# Patient Record
Sex: MALE | Race: BLACK OR AFRICAN AMERICAN
[De-identification: names, ages, dates, MRNs, and addresses within clinical notes are randomized per-mention and may not be internally consistent; named-entity substitution may affect disease eponyms.]

---

## 2018-04-14 ENCOUNTER — HOSPITAL ENCOUNTER (EMERGENCY)
Dept: HOSPITAL 97 - ER | Age: 48
Discharge: HOME | End: 2018-04-14
Payer: COMMERCIAL
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
## 2018-04-14 DIAGNOSIS — R55: Primary | ICD-10-CM
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
## 2018-04-14 LAB
ALBUMIN SERPL BCP-MCNC: 4.4 G/DL (ref 3.2–5.5)
ALP SERPL-CCNC: 54 IU/L (ref 42–121)
ALT SERPL W P-5'-P-CCNC: 15 IU/L (ref 10–60)
AST SERPL W P-5'-P-CCNC: 32 IU/L (ref 10–42)
BLD SMEAR INTERP: (no result)
BUN BLD-MCNC: 14 MG/DL (ref 6–20)
GIANT PLATELETS BLD QL SMEAR: 1
GLUCOSE SERPLBLD-MCNC: 206 MG/DL (ref 65–120)
HCT VFR BLD CALC: 46.5 % (ref 39.6–49)
INR BLD: 0.94
LYMPHOCYTES # SPEC AUTO: 0.5 K/UL (ref 0.7–4.9)
MAGNESIUM SERPL-MCNC: 1.6 MG/DL (ref 1.8–2.5)
MCH RBC QN AUTO: 33.3 PG (ref 27–35)
MCV RBC: 98.9 FL (ref 80–100)
MORPHOLOGY BLD-IMP: (no result)
PMV BLD: 10.3 FL (ref 7.6–11.3)
POTASSIUM SERPL-SCNC: 4 MEQ/L (ref 3.6–5)
RBC # BLD: 4.7 M/UL (ref 4.33–5.43)

## 2018-04-14 PROCEDURE — 99284 EMERGENCY DEPT VISIT MOD MDM: CPT

## 2018-04-14 PROCEDURE — 83880 ASSAY OF NATRIURETIC PEPTIDE: CPT

## 2018-04-14 PROCEDURE — 80048 BASIC METABOLIC PNL TOTAL CA: CPT

## 2018-04-14 PROCEDURE — 82962 GLUCOSE BLOOD TEST: CPT

## 2018-04-14 PROCEDURE — 85610 PROTHROMBIN TIME: CPT

## 2018-04-14 PROCEDURE — 71045 X-RAY EXAM CHEST 1 VIEW: CPT

## 2018-04-14 PROCEDURE — 36415 COLL VENOUS BLD VENIPUNCTURE: CPT

## 2018-04-14 PROCEDURE — 85025 COMPLETE CBC W/AUTO DIFF WBC: CPT

## 2018-04-14 PROCEDURE — 80076 HEPATIC FUNCTION PANEL: CPT

## 2018-04-14 PROCEDURE — 82550 ASSAY OF CK (CPK): CPT

## 2018-04-14 PROCEDURE — 96366 THER/PROPH/DIAG IV INF ADDON: CPT

## 2018-04-14 PROCEDURE — 83735 ASSAY OF MAGNESIUM: CPT

## 2018-04-14 PROCEDURE — 84484 ASSAY OF TROPONIN QUANT: CPT

## 2018-04-14 PROCEDURE — 96365 THER/PROPH/DIAG IV INF INIT: CPT

## 2018-04-14 PROCEDURE — 70450 CT HEAD/BRAIN W/O DYE: CPT

## 2018-04-14 PROCEDURE — 93005 ELECTROCARDIOGRAM TRACING: CPT

## 2018-04-14 NOTE — RAD REPORT
EXAM DESCRIPTION:  RAD - Chest Single View - 4/14/2018 4:44 pm

 

CLINICAL HISTORY:  Syncope, fall, chest injury

 

COMPARISON:  None.

 

TECHNIQUE:  AP portable chest image was obtained 1633 hours .

 

FINDINGS:  Lungs are clear. No cardiomegaly or other suspicious heart finding. Vasculature is normal.
 Trachea is midline. No measurable pleural effusion and no pneumothorax. No gross bony abnormality se
en. No acute aortic findings suspected.

 

IMPRESSION:  No acute cardiopulmonary process.

## 2018-04-14 NOTE — RAD REPORT
EXAM DESCRIPTION:  CT - Head Brain Wo Cont - 4/14/2018 4:46 pm

 

CLINICAL HISTORY:  Syncope

 

COMPARISON:  None.

 

TECHNIQUE:  Axial 5 mm thick images of the head were obtained without IV contrast.

 

All CT scans are performed using dose optimization technique as appropriate and may include automated
 exposure control or mA/KV adjustment according to patient size.

 

FINDINGS:  No intracranial hemorrhage, mass, edema or shift of mid-line structures. No acute infarcti
on changes seen. No abnormal extra-axial fluid collections. Ventricles are normal.

 

Mastoid air cells and visualized portions of the paranasal sinuses are clear.

 

No acute bony findings.

 

 

IMPRESSION:  Negative non-contrast CT head examination.

## 2018-04-14 NOTE — ER
Nurse's Notes                                                                                     

 [facility]                                                                

Name: Devyn Sheehan                                                                                

Age: 47 yrs                                                                                       

Sex: Male                                                                                         

: 1970                                                                                   

MRN: [id number]                                                                                   

Arrival Date: 2018                                                                          

Time: 16:05                                                                                       

Account#: [id number]                                                                            

Bed 4                                                                                             

Private MD:                                                                                       

Diagnosis: Syncope and collapse                                                                   

                                                                                                  

Presentation:                                                                                     

                                                                                             

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  

      like he was going to pass out and then fell to floor, hitting head, was witnessed, pt       

      came around quickly, VSS, pt states he has not been drinking much water over past 24        

      hours. Transition of care: patient was not received from another setting of care. Onset     

      of symptoms was 2018. Care prior to arrival: None.                                

16:05 Method Of Arrival: Ambulatory                                                           iw  

16:05 Acuity: CHETAN 3                                                                           iw  

                                                                                                  

Historical:                                                                                       

- Allergies:                                                                                      

16:09 NKA;                                                                                    iw  

- Home Meds:                                                                                      

16:09 None [Active];                                                                          iw  

- PMHx:                                                                                           

16:09 None;                                                                                   iw  

- PSHx:                                                                                           

16:09 None;                                                                                   iw  

                                                                                                  

- Immunization history:: Adult Immunizations up to date.                                          

- Social history:: Smoking status: Patient/guardian denies using tobacco, never smoked.           

                                                                                                  

                                                                                                  

Screenin:33 Abuse screen: Denies threats or abuse. Nutritional screening: No deficits noted.        ae1 

      Tuberculosis screening: No symptoms or risk factors identified. Fall Risk Fall in past      

      12 months (25 points). No secondary diagnosis (0 pts). IV access (20 points).               

      Ambulatory Aid- None/Bed Rest/Nurse Assist (0 pts). Gait- Normal/Bed Rest/Wheelchair (0     

      pts) Mental Status- Oriented to own ability (0 pts).                                        

                                                                                                  

Assessment:                                                                                       

16:37 General: Appears in no apparent distress. comfortable, well groomed, Behavior is calm,  ae1 

      cooperative. Pain: Denies pain. Neuro: Level of Consciousness is awake, alert, obeys        

      commands, Oriented to person, place, time, situation. Cardiovascular: Heart tones S1 S2     

      present Patient's skin is warm and dry. Rhythm is regular. Respiratory: Airway is           

      patent Respiratory effort is even, unlabored, Respiratory pattern is regular,               

      symmetrical, Breath sounds are clear bilaterally. GI: No signs and/or symptoms were         

      reported involving the gastrointestinal system. : No signs and/or symptoms were           

      reported regarding the genitourinary system. Reports He feels "dehydrated", states he       

      knows how much he really drinks and has not been drinking enough fluid. EENT: No signs      

      and/or symptoms were reported regarding the EENT system. Derm: Skin is normal.              

      Musculoskeletal: No signs and/or symptoms reported regarding the musculoskeletal system.    

17:00 Reassessment: Patient appears in no apparent distress at this time. Family at bedside.  ae1 

      Patient states feeling better.                                                              

17:53 Reassessment: Patient appears in no apparent distress at this time. Patient and/or      ae1 

      family updated on plan of care and expected duration. Pain level reassessed. family at      

      bedside. Patient denies pain at this time. Patient states feeling better.                   

                                                                                                  

Vital Signs:                                                                                      

16:14  / 86; Pulse 55; Resp 14; Temp 98.8(A); Pulse Ox 100% on R/A;                     ae1 

17:05  / 87; Pulse 64; Resp 15; Pulse Ox 100% on R/A;                                   ae1 

17:09  / 91 Supine; Pulse 63; Resp 18; Pulse Ox 99% on R/A;                             ae1 

17:11  / 89 Sitting; Pulse 76; Resp 20; Pulse Ox 99% on R/A;                            ae1 

17:13  / 94; Pulse 78; Resp 20; Pulse Ox 96% on R/A;                                    ae1 

17:47  / 81; Pulse 61; Resp 15; Pulse Ox 100% on R/A;                                   ae1 

                                                                                                  

ED Course:                                                                                        

16:05 Patient arrived in ED.                                                                  iw  

16:08 [facility]                                                               [id number] 

16:08 Ok Askew MD is Attending Physician.                                              [id number] 

16:09 Triage completed.                                                                       iw  

16:13 Nikolai Davalos, RN is Primary Nurse.                                                   ae1 

16:14 Arm band placed on right wrist.                                                         ae1 

16:14 Placed in gown. Bed in low position. Call light in reach. Side rails up X 1. Cardiac    ae1 

      monitor on. Pulse ox on. NIBP on. Warm blanket given.                                       

16:32 Inserted saline lock: 22 gauge in right antecubital area, using aseptic technique.      ae1 

      ,using aseptic technique. By Adolfo  TECH Blood collected.                                

16:41 XRAY Chest (1 view) In Process Unspecified.                                             EDMS

16:46 CT Head Brain wo Cont In Process Unspecified.                                           EDMS

18:23 No provider procedures requiring assistance completed. IV discontinued, intact,         ae1 

      bleeding controlled, No redness/swelling at site. Pressure dressing applied.                

                                                                                                  

Administered Medications:                                                                         

01:15 Drug: Magnesium Sulfate 1 grams Route: IVPB; Infused Over: 1 hrs; Site: right           ae1 

      antecubital;                                                                                

18:24 Follow up: IV Status: Completed infusion                                                ae1 

16:28 Drug: NS 0.9% 1000 ml Route: IV; Rate: 1000 ml; Site: right antecubital;                ae1 

18:24 Follow up: IV Status: Completed infusion                                                ae1 

                                                                                                  

                                                                                                  

Point of Care Testing:                                                                            

      Blood Glucose:                                                                              

16:18 Blood Glucose: 182 mg/dL;                                                               ae1 

      Ranges:                                                                                     

                                                                                                  

Outcome:                                                                                          

17:46 Discharge ordered by MD.                                                                octavio 

18:23 Discharged to home via wheelchair, with family.                                         ae1 

18:23 Condition: stable                                                                           

18:23 Discharge instructions given to patient, Instructed on discharge instructions, follow       

      up and referral plans. Demonstrated understanding of instructions.                          

18:24 Patient left the ED.                                                                    ae1 

                                                                                                  

Addendum:                                                                                         

2018                                                                                        

     12:49 Addendum: Other 1 gram Magnesium Sulfate IVPB was administered at 1720 and infused over a
e1

           1 hour.                                                                                

                                                                                                  

Signatures:                                                                                       

Dispatcher [employer]                           Sherri Christian RN RN iw Roszak, Josh, PA PA jr8                                                  

Nikolai Davalos RN                     RN   ae1                                                  

                                                                                                  

**************************************************************************************************

## 2018-04-14 NOTE — EDPHYS
Physician Documentation                                                                           

 [facility]                                                                

Name: Devyn Sheehan                                                                                

Age: 47 yrs                                                                                       

Sex: Male                                                                                         

: 1970                                                                                   

MRN: [id number]                                                                                   

Arrival Date: 2018                                                                          

Time: 16:05                                                                                       

Account#: [id number]                                                                            

Bed 4                                                                                             

Private MD:                                                                                       

ED Physician Ok Askew                                                                       

HPI:                                                                                              

                                                                                             

16:57 This 47 yrs old Male presents to ER via Ambulatory with complaints of Syncope.          jr8 

16:57 The patient has experienced syncope, collapsed. Onset: The symptoms/episode             jr8 

      began/occurred acutely, today. Duration: This was a single episode. Context: the            

      episode(s) was witnessed, by a friend, occurred hospital, occurred while the patient        

      was standing, Just prior to the episode the patient experienced lightheadedness.            

      Associated injury: The patient did not suffer any apparent associated injury.               

      Associated signs and symptoms: The patient has no apparent associated signs or              

      symptoms. Current symptoms: Currently, the patient is not experiencing any symptoms,        

      the patient feels back to baseline, no decreased level of consciousness, no confusion,      

      no dysphasia, no headache, no paralysis, no visual changes. The patient has not             

      experienced similar symptoms in the past. The patient has not recently seen a               

      physician. 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         

      today. Stated that while standing started to feel lightheaded. Was going to reach for       

      door and collapsed. Stated that he fell backward per staff and hit head. Patient denied     

      chest pain, shortness of breath, numbness, tingling, or weakness prior to or after          

      episode. Feels back to baseline now .                                                       

                                                                                                  

Historical:                                                                                       

- Allergies:                                                                                      

16:09 NKA;                                                                                    iw  

- Home Meds:                                                                                      

16:09 None [Active];                                                                          iw  

- PMHx:                                                                                           

16:09 None;                                                                                   iw  

- PSHx:                                                                                           

16:09 None;                                                                                   iw  

                                                                                                  

- Immunization history:: Adult Immunizations up to date.                                          

- Social history:: Smoking status: Patient/guardian denies using tobacco, never smoked.           

                                                                                                  

                                                                                                  

ROS:                                                                                              

16:57 Eyes: Negative for injury, pain, redness, and discharge, ENT: Negative for injury,      jr8 

      pain, and discharge, Neck: Negative for injury, pain, and swelling, Cardiovascular:         

      Negative for chest pain, palpitations, and edema, Respiratory: Negative for shortness       

      of breath, cough, wheezing, and pleuritic chest pain, Abdomen/GI: Negative for              

      abdominal pain, nausea, vomiting, diarrhea, and constipation, Back: Negative for injury     

      and pain, MS/Extremity: Negative for injury and deformity, Skin: Negative for injury,       

      rash, and discoloration.                                                                    

16:57 Neuro: Positive for syncope.                                                                

                                                                                                  

Exam:                                                                                             

16:57 Head/Face:  Normocephalic, atraumatic. Eyes:  Pupils equal round and reactive to light, jr8 

      extra-ocular motions intact.  Lids and lashes normal.  Conjunctiva and sclera are           

      non-icteric and not injected.  Cornea within normal limits.  Periorbital areas with no      

      swelling, redness, or edema. ENT:  Nares patent. No nasal discharge, no septal              

      abnormalities noted.  Tympanic membranes are normal and external auditory canals are        

      clear.  Oropharynx with no redness, swelling, or masses, exudates, or evidence of           

      obstruction, uvula midline.  Mucous membranes moist. Neck:  Trachea midline, no             

      thyromegaly or masses palpated, and no cervical lymphadenopathy.  Supple, full range of     

      motion without nuchal rigidity, or vertebral point tenderness.  No Meningismus.             

      Cardiovascular:  Regular rate and rhythm with a normal S1 and S2.  No gallops, murmurs,     

      or rubs.  Normal PMI, no JVD.  No pulse deficits. Respiratory:  Lungs have equal breath     

      sounds bilaterally, clear to auscultation and percussion.  No rales, rhonchi or wheezes     

      noted.  No increased work of breathing, no retractions or nasal flaring. Abdomen/GI:        

      Soft, non-tender, with normal bowel sounds.  No distension or tympany.  No guarding or      

      rebound.  No evidence of tenderness throughout. Back:  No spinal tenderness.  No            

      costovertebral tenderness.  Full range of motion. Skin:  Warm, dry with normal turgor.      

      Normal color with no rashes, no lesions, and no evidence of cellulitis. MS/ Extremity:      

      Pulses equal, no cyanosis.  Neurovascular intact.  Full, normal range of motion. Neuro:     

       Awake and alert, GCS 15, oriented to person, place, time, and situation.  Cranial          

      nerves II-XII grossly intact.  Motor strength 5/5 in all extremities.  Sensory grossly      

      intact.  Cerebellar exam normal.  Normal gait.                                              

                                                                                                  

Vital Signs:                                                                                      

16:14  / 86; Pulse 55; Resp 14; Temp 98.8(A); Pulse Ox 100% on R/A;                     ae1 

17:05  / 87; Pulse 64; Resp 15; Pulse Ox 100% on R/A;                                   ae1 

17:09  / 91 Supine; Pulse 63; Resp 18; Pulse Ox 99% on R/A;                             ae1 

17:11  / 89 Sitting; Pulse 76; Resp 20; Pulse Ox 99% on R/A;                            ae1 

17:13  / 94; Pulse 78; Resp 20; Pulse Ox 96% on R/A;                                    ae1 

17:47  / 81; Pulse 61; Resp 15; Pulse Ox 100% on R/A;                                   ae1 

                                                                                                  

MDM:                                                                                              

16:08 Patient medically screened.                                                             jr8 

17:45 Data reviewed: vital signs, nurses notes, lab test result(s), EKG, radiologic studies,  jr8 

      CT scan, plain films, and as a result, I will discharge patient. Data interpreted:          

      Pulse oximetry: on room air is 96 %. Interpretation: normal. Counseling: I had a            

      detailed discussion with the patient and/or guardian regarding: the historical points,      

      exam findings, and any diagnostic results supporting the discharge/admit diagnosis, lab     

      results, radiology results, the need for outpatient follow up, a cardiologist, a family     

      practitioner, to return to the emergency department if symptoms worsen or persist or if     

      there are any questions or concerns that arise at home. ED course: Patient has remained     

      asymptomatic while in ED. Feels back to baseline. No significant abnormalities noted to     

      cause syncope. Mild LVH noted on EKG which I told patient can be normal variant but         

      that cardiology should see him and complete Echocardiogram to r/o. Patient pleased and      

      will follow up .                                                                            

17:46 Differential Diagnosis: cardiac arrhythmia, cerebrovascular accident, drug effect,      jr8 

      emotional response, idiopathic syncope, seizure, transient ischemic attack, vasovagal       

      episode.                                                                                    

                                                                                                  

                                                                                             

16:23 Order name: Basic Metabolic Panel; Complete Time: 17:02                                                                                                                              

16:23 Order name: BNP; Complete Time: 17:05                                                                                                                                                

16:23 Order name: CBC with Diff; Complete Time: 18:21                                                                                                                                      

16:23 Order name: CPK; Complete Time: 17:02                                                                                                                                                

16:23 Order name: LFT's; Complete Time: 17:02                                                                                                                                              

16:23 Order name: Magnesium; Complete Time: 17:02                                                                                                                                          

16:23 Order name: PT-INR; Complete Time: 16:49                                                                                                                                             

16:23 Order name: Troponin (emerg Dept Use Only); Complete Time: 17:02                        8 

                                                                                             

16:23 Order name: XRAY Chest (1 view); Complete Time: 16:57                                                                                                                                

16:23 Order name: EKG; Complete Time: 16:24                                                   8 

                                                                                             

16:23 Order name: CT Head Brain wo Cont; Complete Time: 16:57                                 jr8 

                                                                                             

18:18 Order name: CBC Smear Scan; Complete Time: 18:21                                        EDMS

                                                                                             

16:23 Order name: Cardiac monitoring; Complete Time: 16:32                                                                                                                                 

16:23 Order name: EKG - Nurse/Tech; Complete Time: 16:32                                                                                                                                   

16:23 Order name: IV Saline Lock; Complete Time: 16:32                                                                                                                                     

16:23 Order name: Labs collected and sent; Complete Time: 16:32                                                                                                                            

16:23 Order name: O2 Per Protocol; Complete Time: 16:32                                                                                                                                    

16:23 Order name: O2 Sat Monitoring; Complete Time: 16:32                                                                                                                                  

16:23 Order name: Urine Dipstick-Ancillary (obtain specimen)                                                                                                                               

16:23 Order name: Orthostatics; Complete Time: 18:24                                           

                                                                                                  

Administered Medications:                                                                         

01:15 Drug: Magnesium Sulfate 1 grams Route: IVPB; Infused Over: 1 hrs; Site: right           ae1 

      antecubital;                                                                                

18:24 Follow up: IV Status: Completed infusion                                                ae1 

16:28 Drug: NS 0.9% 1000 ml Route: IV; Rate: 1000 ml; Site: right antecubital;                ae1 

18:24 Follow up: IV Status: Completed infusion                                                ae1 

                                                                                                  

                                                                                                  

Point of Care Testing:                                                                            

      Blood Glucose:                                                                              

16:18 Blood Glucose: 182 mg/dL;                                                               ae1 

      Ranges:                                                                                     

      Critical Glucose Levels:Adult <50 mg/dl or >400 mg/dl  <40 mg/dl or >180 mg/dl       

Disposition:                                                                                      

18:26 Co-signature as Attending Physician, Ok Askew MD I agree with the assessment and   kdr 

      plan of care.                                                                               

                                                                                                  

Disposition:                                                                                      

18 17:46 Discharged to Home. Impression: Syncope and collapse.                              

- Condition is Stable.                                                                            

- Discharge Instructions: Syncope.                                                                

                                                                                                  

- Medication Reconciliation Form, Thank You Letter, Antibiotic Education, Prescription            

  Opioid Use form.                                                                                

- Follow up: Private Physician; When: 2 - 3 days; Reason: Recheck today's complaints,             

  Continuance of care, Re-evaluation by your physician.                                           

- Problem is new.                                                                                 

- Symptoms are resolved.                                                                          

                                                                                                  

                                                                                                  

                                                                                                  

Signatures:                                                                                       

Dispatcher [employer]                           Ok Toth MD MD   kdr                                                  

Sherri Mota, RN                     RN   iw                                                   

Kostas Colbert PA PA   jr8                                                  

Nikolai Davalos RN                     RN   ae1                                                  

                                                                                                  

**************************************************************************************************

## 2018-04-15 NOTE — EKG
Test Date:    2018-04-14               Test Time:    16:24:56

Technician:   AYAAN                                    

                                                     

MEASUREMENT RESULTS:                                       

Intervals:                                           

Rate:         48                                     

IA:           142                                    

QRSD:         82                                     

QT:           426                                    

QTc:          380                                    

Axis:                                                

P:            35                                     

IA:           142                                    

QRS:          64                                     

T:            47                                     

                                                     

INTERPRETIVE STATEMENTS:                                       

                                                     

Sinus bradycardia

Minimal voltage criteria for LVH, may be normal variant

Borderline ECG

No previous ECG available for comparison



Electronically Signed On 04-15-18 22:35:57 CDT by Jose Oakes